# Patient Record
Sex: FEMALE | URBAN - METROPOLITAN AREA
[De-identification: names, ages, dates, MRNs, and addresses within clinical notes are randomized per-mention and may not be internally consistent; named-entity substitution may affect disease eponyms.]

---

## 2021-01-15 DIAGNOSIS — Z23 NEED FOR VACCINATION: ICD-10-CM

## 2021-01-27 ENCOUNTER — IMMUNIZATION (OUTPATIENT)
Dept: FAMILY PLANNING/WOMEN'S HEALTH CLINIC | Facility: IMMUNIZATION CENTER | Age: 76
End: 2021-01-27
Payer: MEDICARE

## 2021-01-27 ENCOUNTER — APPOINTMENT (OUTPATIENT)
Dept: FAMILY PLANNING/WOMEN'S HEALTH CLINIC | Facility: IMMUNIZATION CENTER | Age: 76
End: 2021-01-27
Attending: INTERNAL MEDICINE
Payer: MEDICARE

## 2021-01-27 DIAGNOSIS — Z23 ENCOUNTER FOR VACCINATION: Primary | ICD-10-CM

## 2021-01-27 DIAGNOSIS — Z23 NEED FOR VACCINATION: ICD-10-CM

## 2021-01-27 PROCEDURE — 0001A PFIZER SARS-COV-2 VACCINE: CPT

## 2021-01-27 PROCEDURE — 91300 PFIZER SARS-COV-2 VACCINE: CPT

## 2021-02-19 ENCOUNTER — IMMUNIZATION (OUTPATIENT)
Dept: FAMILY PLANNING/WOMEN'S HEALTH CLINIC | Facility: IMMUNIZATION CENTER | Age: 76
End: 2021-02-19
Attending: INTERNAL MEDICINE
Payer: MEDICARE

## 2021-02-19 DIAGNOSIS — Z23 ENCOUNTER FOR VACCINATION: Primary | ICD-10-CM

## 2021-02-19 PROCEDURE — 91300 PFIZER SARS-COV-2 VACCINE: CPT | Performed by: NURSE PRACTITIONER

## 2021-02-19 PROCEDURE — 0002A PFIZER SARS-COV-2 VACCINE: CPT | Performed by: NURSE PRACTITIONER

## 2021-02-22 ENCOUNTER — NURSE TRIAGE (OUTPATIENT)
Dept: HEALTH INFORMATION MANAGEMENT | Facility: OTHER | Age: 76
End: 2021-02-22

## 2021-02-22 NOTE — TELEPHONE ENCOUNTER
"Pt got 2nd shot on Friday. Fever on Saturday/Sunday, gone this am. Pt had chills. Pt has a tennis ball rash that itches. Pt states this rash developed on Saturday, has not gotten worse, is itchy, and  states it has gotten bigger, swollen warm to touch. There is a lump, better today. Pt states she had similar reaction with past vaccines  Pt spoke with her pcp this am who told her to take tylenol.  Pt states she feels 100% better than yesterday. Pt told to take tylenol/ibuprofen for arm, and ice packs, if it doesn't get better/ gets worse, to call back.  Reason for Disposition  • Mild immunization reaction    Additional Information  • Negative: Difficulty with breathing or swallowing starts within 2 hours after injection  • Negative: Difficult to awaken or acting confused (e.g., disoriented, slurred speech)  • Negative: Unresponsive, passed out, or very weak  • Negative: Sounds like a life-threatening emergency to the triager  • Negative: Sounds like a severe, unusual reaction to the triager  • Negative: Fever > 103 F (39.4 C)  • Negative: Fever > 101 F (38.3 C) and over 60 years of age  • Negative: Fever > 100.0 F (37.8 C) and has diabetes mellitus or a weak immune system (e.g., HIV positive, cancer chemotherapy, organ transplant, splenectomy, chronic steroids)  • Negative: Fever > 100.0 F (37.8 C) and bedridden (e.g., nursing home patient, stroke, chronic illness, recovering from surgery)  • Negative: Measles vaccine and purple/blood-colored rash (onset day 6-12)  • Negative: Redness or red streak around the injection site begins > 48 hours after shot  • Negative: Fever present > 3 days (72 hours)  • Negative: Smallpox vaccine and eye pain, eye redness, or rash on eyelids  • Negative: Deep lump follows (in 2 to 8 weeks) Td or TDaP shot, and becomes tender to the touch  • Negative: Patient wants to be seen    Answer Assessment - Initial Assessment Questions  1. SYMPTOMS: \"What is the main symptom?\" (e.g., " "redness, swelling, pain)       Redness to the vaccine arm  2. ONSET: \"When was the vaccine (shot) given?\" \"How much later did the red/swelling begin?\" (e.g., hours, days ago)       2 days ago (vaccine on Friday)  3. SEVERITY: \"How bad is it?\"       lump  4. FEVER: \"Is there a fever?\" If so, ask: \"What is it, how was it measured, and when did it start?\"       No fever this morning  5. IMMUNIZATIONS GIVEN: \"What shots have you recently received?\"      2nd dose of pfiezer  6. PAST REACTIONS: \"Have you reacted to immunizations before?\" If so, ask: \"What happened?\"      Yes, same thing happened with my other vaccine  7. OTHER SYMPTOMS: \"Do you have any other symptoms?\"      No longer    Protocols used: IMMUNIZATION [id number]      "

## 2022-04-15 ENCOUNTER — RESEARCH ENCOUNTER (OUTPATIENT)
Dept: RESEARCH | Facility: WORKSITE | Age: 77
End: 2022-04-15
Payer: MEDICARE

## 2022-04-15 DIAGNOSIS — Z00.6 RESEARCH STUDY PATIENT: Primary | ICD-10-CM

## 2022-05-15 LAB
APOB+LDLR+PCSK9 GENE MUT ANL BLD/T: NOT DETECTED
BRCA1+BRCA2 DEL+DUP + FULL MUT ANL BLD/T: NOT DETECTED
MLH1+MSH2+MSH6+PMS2 GN DEL+DUP+FUL M: NOT DETECTED